# Patient Record
Sex: FEMALE | Race: BLACK OR AFRICAN AMERICAN | NOT HISPANIC OR LATINO | URBAN - METROPOLITAN AREA
[De-identification: names, ages, dates, MRNs, and addresses within clinical notes are randomized per-mention and may not be internally consistent; named-entity substitution may affect disease eponyms.]

---

## 2021-03-12 ENCOUNTER — HISTORICAL (OUTPATIENT)
Dept: ADMINISTRATIVE | Facility: HOSPITAL | Age: 41
End: 2021-03-12

## 2021-03-12 LAB
ABS NEUT (OLG): 4.85 X10(3)/MCL (ref 2.1–9.2)
ALBUMIN SERPL-MCNC: 3.9 GM/DL (ref 3.5–5)
ALBUMIN/GLOB SERPL: 1.3 RATIO (ref 1.1–2)
ALP SERPL-CCNC: 66 UNIT/L (ref 40–150)
ALT SERPL-CCNC: 15 UNIT/L (ref 0–55)
APPEARANCE, UA: CLEAR
AST SERPL-CCNC: 15 UNIT/L (ref 5–34)
BACTERIA #/AREA URNS AUTO: ABNORMAL /HPF
BASOPHILS # BLD AUTO: 0 X10(3)/MCL (ref 0–0.2)
BASOPHILS NFR BLD AUTO: 0 %
BILIRUB SERPL-MCNC: 0.7 MG/DL
BILIRUB UR QL STRIP: NEGATIVE
BILIRUBIN DIRECT+TOT PNL SERPL-MCNC: 0.3 MG/DL (ref 0–0.5)
BILIRUBIN DIRECT+TOT PNL SERPL-MCNC: 0.4 MG/DL (ref 0–0.8)
BUN SERPL-MCNC: 10 MG/DL (ref 7–18.7)
CALCIUM SERPL-MCNC: 8.6 MG/DL (ref 8.4–10.2)
CHLORIDE SERPL-SCNC: 109 MMOL/L (ref 98–107)
CHOLEST SERPL-MCNC: 130 MG/DL
CHOLEST/HDLC SERPL: 3 {RATIO} (ref 0–5)
CO2 SERPL-SCNC: 24 MMOL/L (ref 22–29)
COLOR UR: ABNORMAL
CREAT SERPL-MCNC: 0.91 MG/DL (ref 0.55–1.02)
EOSINOPHIL # BLD AUTO: 0.2 X10(3)/MCL (ref 0–0.9)
EOSINOPHIL NFR BLD AUTO: 2 %
ERYTHROCYTE [DISTWIDTH] IN BLOOD BY AUTOMATED COUNT: 16.5 % (ref 11.5–14.5)
EST CREAT CLEARANCE SER (OHS): 85.58 ML/MIN
EST. AVERAGE GLUCOSE BLD GHB EST-MCNC: 68.1 MG/DL
FOLATE SERPL-MCNC: 9.3 NG/ML (ref 7–31.4)
GLOBULIN SER-MCNC: 3 GM/DL (ref 2.4–3.5)
GLUCOSE (UA): NEGATIVE
GLUCOSE SERPL-MCNC: 79 MG/DL (ref 74–100)
HAV IGM SERPL QL IA: NONREACTIVE
HBA1C MFR BLD: 4 %
HBV CORE IGM SERPL QL IA: NONREACTIVE
HBV SURFACE AG SERPL QL IA: NONREACTIVE
HCT VFR BLD AUTO: 38.2 % (ref 35–46)
HCV AB SERPL QL IA: NONREACTIVE
HDLC SERPL-MCNC: 49 MG/DL (ref 35–60)
HGB BLD-MCNC: 12.8 GM/DL (ref 12–16)
HGB UR QL STRIP: NEGATIVE
HIV 1+2 AB+HIV1 P24 AG SERPL QL IA: NONREACTIVE
HYALINE CASTS #/AREA URNS LPF: ABNORMAL /LPF
IMM GRANULOCYTES # BLD AUTO: 0.03 10*3/UL
IMM GRANULOCYTES NFR BLD AUTO: 0 %
KETONES UR QL STRIP: NEGATIVE
LDLC SERPL CALC-MCNC: 70 MG/DL (ref 50–140)
LEUKOCYTE ESTERASE UR QL STRIP: 75 LEU/UL
LYMPHOCYTES # BLD AUTO: 2 X10(3)/MCL (ref 0.6–4.6)
LYMPHOCYTES NFR BLD AUTO: 26 %
MCH RBC QN AUTO: 25.7 PG (ref 26–34)
MCHC RBC AUTO-ENTMCNC: 33.5 GM/DL (ref 31–37)
MCV RBC AUTO: 76.6 FL (ref 80–100)
MONOCYTES # BLD AUTO: 0.5 X10(3)/MCL (ref 0.1–1.3)
MONOCYTES NFR BLD AUTO: 6 %
NEUTROPHILS # BLD AUTO: 4.85 X10(3)/MCL (ref 2.1–9.2)
NEUTROPHILS NFR BLD AUTO: 65 %
NITRITE UR QL STRIP: NEGATIVE
PH UR STRIP: 7 [PH] (ref 4.5–8)
PLATELET # BLD AUTO: 216 X10(3)/MCL (ref 130–400)
PMV BLD AUTO: 10.6 FL (ref 7.4–10.4)
POTASSIUM SERPL-SCNC: 4.6 MMOL/L (ref 3.5–5.1)
PROT SERPL-MCNC: 6.9 GM/DL (ref 6.4–8.3)
PROT UR QL STRIP: NEGATIVE
RBC # BLD AUTO: 4.99 X10(6)/MCL (ref 4–5.2)
RBC #/AREA URNS AUTO: ABNORMAL /HPF
SODIUM SERPL-SCNC: 140 MMOL/L (ref 136–145)
SP GR UR STRIP: 1.01 (ref 1–1.03)
SQUAMOUS #/AREA URNS LPF: ABNORMAL /LPF
T PALLIDUM AB SER QL: NONREACTIVE
TRIGL SERPL-MCNC: 57 MG/DL (ref 37–140)
TSH SERPL-ACNC: 0.71 UIU/ML (ref 0.35–4.94)
UROBILINOGEN UR STRIP-ACNC: NORMAL
VLDLC SERPL CALC-MCNC: 11 MG/DL
WBC # SPEC AUTO: 7.5 X10(3)/MCL (ref 4.5–11)
WBC #/AREA URNS AUTO: ABNORMAL /HPF

## 2021-03-15 LAB — FINAL CULTURE: NORMAL

## 2021-04-08 ENCOUNTER — HISTORICAL (OUTPATIENT)
Dept: RADIOLOGY | Facility: HOSPITAL | Age: 41
End: 2021-04-08

## 2022-04-09 ENCOUNTER — HISTORICAL (OUTPATIENT)
Dept: ADMINISTRATIVE | Facility: HOSPITAL | Age: 42
End: 2022-04-09

## 2022-04-27 VITALS
WEIGHT: 240.06 LBS | BODY MASS INDEX: 35.56 KG/M2 | DIASTOLIC BLOOD PRESSURE: 69 MMHG | SYSTOLIC BLOOD PRESSURE: 105 MMHG | HEIGHT: 69 IN

## 2022-05-02 NOTE — HISTORICAL [facility] CERNER
This is a historical note converted from [place]anna. Formatting and pictures may have been removed.  Please reference Cerc[facility]ar for original formatting and attached multimedia. Chief Complaint  Need PCP, med refills,c/o pain to right shoulder  History of Present Illness  Pt is a?40 Years?old?AA Female here to establish PCP in [facility], formerly seeing?MD?in [place], [place], for primary care. PMH sickle cell anemia, constipation, depression,?GERD, obesity and tobacco abuse. States has been out of meds for ~ 1 yr since moving from [place]. Pt states she cant remember MDs?name, will call clinic when obtained from old paperwork at home.?Does not follow dash diet or exercise. Does not drink hardly any water daily; admits to knowing hydration is necessary with sickle cell dz. Smokes 1/4 ppd/cigs, onset ~ 6 months ago and marijuana since out of depression medicine (unsure of name). States her 15 y/o daughter has schizophrenia and behavior disorders, cutting,?and is in a group home and continues to run away and gets gang raped. Pt states the marijuana and cigarettes help calm her.?Last saw GYN ~ 5 yrs, has never had MMG. Pt reports constant, throbbing, sharp right shoulder pain, states she cannot lay on that side d/t pain. States had left shoulder surgery in [place], from my sickle cell wearing down the bone and was supposed to have surgery on the right shoulder prior to moving. Also reports has a bump that comes and goes over the past year, that I bust and some thick white stuff comes out, not like pus, and it goes away and comes back. Pt showed photo on cell phone with surrounding redness noted. Declines tetanus vaccine today. Denies chest pain, shortness of breath,?cough, fever, headache,?dizziness, weakness, abdominal pain, nausea,?vomiting, diarrhea, constipation, dysuria, anxiety, SI/HI.  Review of Systems  Constitutional: negative except as stated in HPI  Eye: negative except as stated in HPI  ENT: negative except as stated in  HPI  Respiratory: negative except as stated in HPI  Cardiovascular: negative except as stated in HPI  Gastrointestinal: negative except as stated in HPI  Genitourinary: negative except as stated in HPI  Heme/Lymph: negative except as stated in HPI  Endocrine: negative except as stated in HPI  Immunologic: negative except as stated in HPI  Musculoskeletal: negative except as stated in HPI  Integumentary: negative except as stated in HPI  Neurologic: negative except as stated in HPI  ?   All Other ROS_ negative except as stated in HPI  Physical Exam  Vitals & Measurements  T:?36.6? ?C (Oral)? HR:?75(Peripheral)? RR:?20? BP:?105/69?  HT:?175.00?cm? WT:?108.900?kg? BMI:?35.56? LMP:?03/02/2021 00:00 CST?  General: Alert and oriented, No acute distress.  Head: Normocephalic, Atraumatic.  Eye: Pupils are equal, round and reactive to light, Extraocular movements are intact, Normal conjunctiva, Sclera non-icteric.  Ears/Nose/Mouth/Throat: Normal hearing, Oral mucosa is moist, No pharyngeal erythema.  Neck/Thyroid: Supple, Non-tender, No lymphadenopathy, No thyromegaly, No carotid bruit, No JVD, Full range of motion.  Respiratory: Clear to auscultation bilaterally, Non-labored Respirations, Breath sounds are equal, Symmetrical chest wall expansion, No wheezes, rales or rhonchi.  Cardiovascular: Regular rate and rhythm, Normal S1/S2, No murmurs, rubs or gallops.?Normal peripheral perfusion, No edema.  Gastrointestinal: Soft, obese, Non-tender, Non-distended, Normal bowel sounds, No palpable organomegaly.  Genitourinary: No costovertebral angle tenderness, No inguinal tenderness.  Musculoskeletal: Normal range of motion, Normal strength, No tenderness, Normal gait.  Musculoskeletal:?Right?SHOULDER  Inspection:?Normal posture;?No guarding or self imposed immobilization of arm;?No swelling;?No erythema;?No contusion;?No atrophy or deconditioning noted;?Normal alignment  Palpation:?Tender to AC joint; Crepitus:?Negative;?Normal  temperature  ROM:  ?????Internal Rotation (30): 30  ?????External Rotation (60): 60  ?????Flexion (180): 160  ?????Extension (45): 45  ?????Adduction (45): 45  ?????Abduction (180): 180  Strength:  ???? Flexion: ?5/5  ?????Extension: ?5/5  Special Tests:  ?????Winging of Scapula:?Negative  ?????AC Joint Tenderness:?Negative  ???? Apley Scratch:?Negative  ???? Supraspinatus Isometric Strength:?Negative  ?????Painful Arc:?Negative  ???? Jobes Empty Can:?Negative  ?????Neers:?Negative  ?????Roberto Nunez:?Negative  Neurovascular: Intact, 2+ distal pulses, sensation intact to light touch  Lymphatic: No lymphadenopathy  Integumentary: Warm, Dry, Intact, No suspicious lesions or rashes.  Neurologic: No focal deficits, Cranial Nerves II-XII are grossly intact, Motor strength normal upper and lower extremities, Sensory exam intact.  Psychiatric: Normal interaction, Coherent speech, Euthymic mood, Appropriate affect.  Feet: 2+ pedal pulses bilaterally.  Assessment/Plan  1.?Obesity, Class II, BMI 35-39.9?E66.9  ?BMI 35. Goal BMI <30.  Aerobic exercise 150 minutes per week.  Avoid soda, simple sugars, sweets, excessive rice, pasta, potatoes or bread.?  Choose brown options when available and portion control.  Limit fast foods and fried foods.?  Choose complex carbs in moderation (ex: green, leafy vegetables, beans, oatmeal).  Eat plenty of fresh fruits and vegetables with lean meats daily.?  Consider permanent healthy lifestyle changes.  Ordered:  1160F- Medication reconciliation completed during visit, Obesity, Class II, BMI 35-39.9  Sickle cell disease  Tobacco user  Wellness examination  Screening cholesterol level  Screening for diabetes mellitus  Thyroid disorder screen  Screening for malignant neoplasm of breast  Anxiety and depression,...  1160F- Medication reconciliation completed during visit, Obesity, Class II, BMI 35-39.9  Depression  Sickle cell disease  Constipation  Tobacco user  Wellness  examination  Screening cholesterol level  Screening for diabetes mellitus  Thyroid disorder screen  Screening for malignant neoplasm of abraham...  Clinic Follow up, *Est. 04/12/21 7:40:00 CDT, Order for future visit, Anxiety and depression, [facility]  Office/Outpatient Visit Level 4 New [id number] PC, Obesity, Class II, BMI 35-39.9  Depression  Sickle cell disease  Constipation  Tobacco user  Wellness examination  Screening cholesterol level  Screening for diabetes mellitus  Thyroid disorder screen  Screening for malignant neoplasm of abraham...  ?  2.?Depression?F32.9  ?Rx effexor 37.5 mg.  Denies SI/HI.  F/U in 1 month to evaluate.  Read positive daily meditations, avoid negative?media, set healthy boundaries.  Exercise daily, keep consistent sleep pattern, eat?a healthy diet.  Establish good social support, make changes to reduce stress.  Do not drink alcohol or use illicit drugs.  Reports any symptoms of suicidal/homicidal ideations or self?harm?immediately, go to nearest emergency room.  Ordered:  1160F- Medication reconciliation completed during visit, Obesity, Class II, BMI 35-39.9  Depression  Sickle cell disease  Constipation  Tobacco user  Wellness examination  Screening cholesterol level  Screening for diabetes mellitus  Thyroid disorder screen  Screening for malignant neoplasm of abraham...  Office/Outpatient Visit Level 4 New [id number] PC, Obesity, Class II, BMI 35-39.9  Depression  Sickle cell disease  Constipation  Tobacco user  Wellness examination  Screening cholesterol level  Screening for diabetes mellitus  Thyroid disorder screen  Screening for malignant neoplasm of abraham...  ?  3.?Sickle cell disease?D57.1  ?Rx hydroxyurea 100 mg daily as previously prescribed.  Rx folic acid 1 mg daily.  Encouraged to drink no less than 64 ozs of water daily.  Take  mg prn joint pain.  ED precautions advised for crisis.  Ordered:  1160F- Medication reconciliation completed during visit,  Obesity, Class II, BMI 35-39.9  Sickle cell disease  Tobacco user  Wellness examination  Screening cholesterol level  Screening for diabetes mellitus  Thyroid disorder screen  Screening for malignant neoplasm of breast  Anxiety and depression,...  1160F- Medication reconciliation completed during visit, Obesity, Class II, BMI 35-39.9  Depression  Sickle cell disease  Constipation  Tobacco user  Wellness examination  Screening cholesterol level  Screening for diabetes mellitus  Thyroid disorder screen  Screening for malignant neoplasm of abraham...  Clinic Follow up, *Est. 04/12/21 7:40:00 CDT, Order for future visit, Anxiety and depression, [facility]  Office/Outpatient Visit Level 4 New 06117 PC, Obesity, Class II, BMI 35-39.9  Depression  Sickle cell disease  Constipation  Tobacco user  Wellness examination  Screening cholesterol level  Screening for diabetes mellitus  Thyroid disorder screen  Screening for malignant neoplasm of abraham...  ?  4.?Constipation?K59.00  ?Continue linzess 290 mg as previously prescribed.  Educated on high fiber diet and exercise.  Drink at least 64 ozs of water daily.  Eat hot breakfast q am.  Ordered:  1160F- Medication reconciliation completed during visit, Obesity, Class II, BMI 35-39.9  Depression  Sickle cell disease  Constipation  Tobacco user  Wellness examination  Screening cholesterol level  Screening for diabetes mellitus  Thyroid disorder screen  Screening for malignant neoplasm of abraham...  Office/Outpatient Visit Level 4 New 25105 PC, Obesity, Class II, BMI 35-39.9  Depression  Sickle cell disease  Constipation  Tobacco user  Wellness examination  Screening cholesterol level  Screening for diabetes mellitus  Thyroid disorder screen  Screening for malignant neoplasm of abraham...  ?  5.?Tobacco user?Z72.0  ?Smoking cessation discussed.?  Pt not ready to quit.  Referred to Smoking Cessation program.  Discussed benefits of quitting  including improved health, decreased cardiac/vascular/pulmonary/stroke risks as well as saving money.  Ordered:  1160F- Medication reconciliation completed during visit, Obesity, Class II, BMI 35-39.9  Sickle cell disease  Tobacco user  Wellness examination  Screening cholesterol level  Screening for diabetes mellitus  Thyroid disorder screen  Screening for malignant neoplasm of breast  Anxiety and depression,...  1160F- Medication reconciliation completed during visit, Obesity, Class II, BMI 35-39.9  Depression  Sickle cell disease  Constipation  Tobacco user  Wellness examination  Screening cholesterol level  Screening for diabetes mellitus  Thyroid disorder screen  Screening for malignant neoplasm of abraham...  Clinic Follow up, *Est. 04/12/21 7:40:00 CDT, Order for future visit, Anxiety and depression, [facility]  Office/Outpatient Visit Level 4 New 74605 PC, Obesity, Class II, BMI 35-39.9  Depression  Sickle cell disease  Constipation  Tobacco user  Wellness examination  Screening cholesterol level  Screening for diabetes mellitus  Thyroid disorder screen  Screening for malignant neoplasm of abraham...  ?  6.?Wellness examination?Z00.00  ?Cervical Cancer Screening?-?Last Pap on > 5 yrs.??Referred to?GYN Clinic for annual?Pap/Pelvic.  Breast Cancer?Screening -?Never had MMG. MMG ordered. Follow up annually.  Eye Exam -?  Dental Exam?-  Vaccinations: ?Flu - declines /?Tetanus?- unsure  Labwork -?ordered; pt is fasting.  Ordered:  1160F- Medication reconciliation completed during visit, Obesity, Class II, BMI 35-39.9  Sickle cell disease  Tobacco user  Wellness examination  Screening cholesterol level  Screening for diabetes mellitus  Thyroid disorder screen  Screening for malignant neoplasm of breast  Anxiety and depression,...  1160F- Medication reconciliation completed during visit, Obesity, Class II, BMI 35-39.9  Depression  Sickle cell disease  Constipation  Tobacco user   Wellness examination  Screening cholesterol level  Screening for diabetes mellitus  Thyroid disorder screen  Screening for malignant neoplasm of abraham...  Office/Outpatient Visit Level 4 New 36061 PC, Obesity, Class II, BMI 35-39.9  Depression  Sickle cell disease  Constipation  Tobacco user  Wellness examination  Screening cholesterol level  Screening for diabetes mellitus  Thyroid disorder screen  Screening for malignant neoplasm of abraham...  Urine Culture [id number], Routine collect, 03/12/21 10:40:00 CST, Urine, Collected, Nurse collect, Urine, [id number], Stop date 03/12/21 10:40:00 CST, ~INHERIT, Wellness examination  ?  7.?Screening cholesterol level?Z13.220  ?FLP ordered.  Ordered:  1160F- Medication reconciliation completed during visit, Obesity, Class II, BMI 35-39.9  Sickle cell disease  Tobacco user  Wellness examination  Screening cholesterol level  Screening for diabetes mellitus  Thyroid disorder screen  Screening for malignant neoplasm of breast  Anxiety and depression,...  1160F- Medication reconciliation completed during visit, Obesity, Class II, BMI 35-39.9  Depression  Sickle cell disease  Constipation  Tobacco user  Wellness examination  Screening cholesterol level  Screening for diabetes mellitus  Thyroid disorder screen  Screening for malignant neoplasm of abraham...  Office/Outpatient Visit Level 4 New 23424 PC, Obesity, Class II, BMI 35-39.9  Depression  Sickle cell disease  Constipation  Tobacco user  Wellness examination  Screening cholesterol level  Screening for diabetes mellitus  Thyroid disorder screen  Screening for malignant neoplasm of abraham...  ?  8.?Screening for diabetes mellitus?Z13.1  ?A1C ordered.  Ordered:  1160F- Medication reconciliation completed during visit, Obesity, Class II, BMI 35-39.9  Sickle cell disease  Tobacco user  Wellness examination  Screening cholesterol level  Screening for diabetes mellitus  Thyroid disorder screen   Screening for malignant neoplasm of breast  Anxiety and depression,...  1160F- Medication reconciliation completed during visit, Obesity, Class II, BMI 35-39.9  Depression  Sickle cell disease  Constipation  Tobacco user  Wellness examination  Screening cholesterol level  Screening for diabetes mellitus  Thyroid disorder screen  Screening for malignant neoplasm of abraham...  Office/Outpatient Visit Level 4 New 54812 PC, Obesity, Class II, BMI 35-39.9  Depression  Sickle cell disease  Constipation  Tobacco user  Wellness examination  Screening cholesterol level  Screening for diabetes mellitus  Thyroid disorder screen  Screening for malignant neoplasm of abraham...  ?  9.?Thyroid disorder screen?Z13.29  ?TSH ordered.  Ordered:  1160F- Medication reconciliation completed during visit, Obesity, Class II, BMI 35-39.9  Sickle cell disease  Tobacco user  Wellness examination  Screening cholesterol level  Screening for diabetes mellitus  Thyroid disorder screen  Screening for malignant neoplasm of breast  Anxiety and depression,...  1160F- Medication reconciliation completed during visit, Obesity, Class II, BMI 35-39.9  Depression  Sickle cell disease  Constipation  Tobacco user  Wellness examination  Screening cholesterol level  Screening for diabetes mellitus  Thyroid disorder screen  Screening for malignant neoplasm of abraham...  Office/Outpatient Visit Level 4 New 32461 PC, Obesity, Class II, BMI 35-39.9  Depression  Sickle cell disease  Constipation  Tobacco user  Wellness examination  Screening cholesterol level  Screening for diabetes mellitus  Thyroid disorder screen  Screening for malignant neoplasm of abraham...  ?  10.?Screening for malignant neoplasm of breast?Z12.39  ?MMG ordered.  Ordered:  1160F- Medication reconciliation completed during visit, Obesity, Class II, BMI 35-39.9  Sickle cell disease  Tobacco user  Wellness examination  Screening cholesterol level   Screening for diabetes mellitus  Thyroid disorder screen  Screening for malignant neoplasm of breast  Anxiety and depression,...  1160F- Medication reconciliation completed during visit, Obesity, Class II, BMI 35-39.9  Depression  Sickle cell disease  Constipation  Tobacco user  Wellness examination  Screening cholesterol level  Screening for diabetes mellitus  Thyroid disorder screen  Screening for malignant neoplasm of abraham...  MG Screening Bilateral, Routine, *Est. 04/12/21 3:00:00 CDT, Screening, None, Ambulatory, Rad Type, Order for future visit, Screening for malignant neoplasm of breast, Schedule this test, [facility], Follow Breast Imaging Order Set Protocol, *Est. 04/12/...  Office/Outpatient Visit Level 4 New 66058 PC, Obesity, Class II, BMI 35-39.9  Depression  Sickle cell disease  Constipation  Tobacco user  Wellness examination  Screening cholesterol level  Screening for diabetes mellitus  Thyroid disorder screen  Screening for malignant neoplasm of abraham...  ?  11.?GERD (gastroesophageal reflux disease)?K21.9  ?Continue protonix 20 mg daily.  Avoid spicy, acidic, fried food and alcohol.?  Eat 2-3 hours before bed.  Avoid tight fitting clothes and chew food thoroughly.?  Reduce caffeine intake, avoid soda.?  Take meds as prescribed.?  Encouraged smoking cessation.  Ordered:  1160F- Medication reconciliation completed during visit, Obesity, Class II, BMI 35-39.9  Depression  Sickle cell disease  Constipation  Tobacco user  Wellness examination  Screening cholesterol level  Screening for diabetes mellitus  Thyroid disorder screen  Screening for malignant neoplasm of abraham...  Office/Outpatient Visit Level 4 New 77613 PC, Obesity, Class II, BMI 35-39.9  Depression  Sickle cell disease  Constipation  Tobacco user  Wellness examination  Screening cholesterol level  Screening for diabetes mellitus  Thyroid disorder screen  Screening for malignant  neoplasm of abraham...  ?  12.?Routine screening for STI (sexually transmitted infection)?Z11.3  ?STI screening labs ordered.  Ordered:  1160F- Medication reconciliation completed during visit, Obesity, Class II, BMI 35-39.9  Depression  Sickle cell disease  Constipation  Tobacco user  Wellness examination  Screening cholesterol level  Screening for diabetes mellitus  Thyroid disorder screen  Screening for malignant neoplasm of abraham...  Chlam trachom & N gonorrhoeae by [facility] [id number], Routine collect, Urine, 03/12/21 10:40:00 CST by CLIENT, Once, Stop date 03/12/21 10:40:00 CST, Nurse collect, Urine, Routine screening for STI (sexually transmitted infection), ~INHERIT, 03/12/21 10:06:00 CST  Office/Outpatient Visit Level 4 New 29336 PC, Obesity, Class II, BMI 35-39.9  Depression  Sickle cell disease  Constipation  Tobacco user  Wellness examination  Screening cholesterol level  Screening for diabetes mellitus  Thyroid disorder screen  Screening for malignant neoplasm of abraham...  ?  13.?Right shoulder pain?M25.511  ?XR right shoulder today.  Will call with results and refer to ortho if indicated.  Perform?shoulder exercises?daily.?  Avoid activities than increase?shoulder?pain or stiffness.?  Apply heating pad, ice pack, and BioFreeze/topical capsaicin?as needed; alternate every 15-20 minutes.??  Continue NSAID/muscle relaxer as needed.  Ordered:  1160F- Medication reconciliation completed during visit, Obesity, Class II, BMI 35-39.9  Depression  Sickle cell disease  Constipation  Tobacco user  Wellness examination  Screening cholesterol level  Screening for diabetes mellitus  Thyroid disorder screen  Screening for malignant neoplasm of abraham...  Office/Outpatient Visit Level 4 New 27395 PC, Obesity, Class II, BMI 35-39.9  Depression  Sickle cell disease  Constipation  Tobacco user  Wellness examination  Screening cholesterol level  Screening for diabetes mellitus  Thyroid disorder  screen  Screening for malignant neoplasm of abraham...  ?  14.?Abscess of right breast?N61.1  ?Wound culture collected.  Rx doxycycline 100 mg daily.  Apply warm compress BID.  ED precautions for worsening, fever or signs of infection.  Ordered:  1160F- Medication reconciliation completed during visit, Obesity, Class II, BMI 35-39.9  Depression  Sickle cell disease  Constipation  Tobacco user  Wellness examination  Screening cholesterol level  Screening for diabetes mellitus  Thyroid disorder screen  Screening for malignant neoplasm of abraham...  Office/Outpatient Visit Level 4 New 77344 PC, Obesity, Class II, BMI 35-39.9  Depression  Sickle cell disease  Constipation  Tobacco user  Wellness examination  Screening cholesterol level  Screening for diabetes mellitus  Thyroid disorder screen  Screening for malignant neoplasm of abraham...  Wound Culture, Routine collect, 03/12/21 10:57:00 CST, Boil, Breast R, Nurse collect, Stop date 03/12/21 10:57:00 CST, Abscess of right breast  ?  Orders:  doxycycline, 100 mg = 1 cap(s), Oral, Daily, X 10 day(s), # 10 cap(s), 0 Refill(s), Pharmacy: [facility] #[id number], 175, cm, Height/Length Dosing, 03/12/21 9:34:00 CST, 108.9, kg, Weight Dosing, 03/12/21 9:34:00 CST  folic acid, 1 mg = 1 tab(s), Oral, Daily, # 30 tab(s), 3 Refill(s), Pharmacy: [facility] #[id number], 175, cm, Height/Length Dosing, 03/12/21 9:34:00 CST, 108.9, kg, Weight Dosing, 03/12/21 9:34:00 CST  hydroxyurea, 100 mg = 1 tab(s), Oral, Daily, # 30 tab(s), 3 Refill(s), Pharmacy: [facility] #[id number], 175, cm, Height/Length Dosing, 03/12/21 9:34:00 CST, 108.9, kg, Weight Dosing, 03/12/21 9:34:00 CST  linaclotide, 290 mcg = 1 cap(s), Oral, Daily, # 30 cap(s), 3 Refill(s), Pharmacy: [facility] #[id number], 175, cm, Height/Length Dosing, 03/12/21 9:34:00 CST, 108.9, kg, Weight Dosing, 03/12/21 9:34:00 CST  pantoprazole, 20 mg = 1 tab(s), Oral, Daily, # 30 tab(s), 3 Refill(s),  Pharmacy: [facility] #[id number], 175, cm, Height/Length Dosing, 21 9:34:00 CST, 108.9, kg, Weight Dosing, 21 9:34:00 CST  venlafaxine, 37.5 mg = 1 cap(s), Oral, Daily, # 30 cap(s), 3 Refill(s), Pharmacy: [facility] #[id number], 175, cm, Height/Length Dosing, 21 9:34:00 CST, 108.9, kg, Weight Dosing, 21 9:34:00 CST  Will call with abn lab results.  RTC 1 month and prn.  Referrals  [facility] Internal Referral to Gynecology Clinic, Specialty: Gynecology, Start: 21 9:41:00 CST  Clinic Follow up, *Est. 21 7:40:00 CDT, Order for future visit, Anxiety and depression, [facility]   Problem List/Past Medical History  Ongoing  Constipation  Depression  GERD (gastroesophageal reflux disease)  Obesity, Class II, BMI 35-39.9  Sickle cell disease  Historical  No qualifying data  Procedure/Surgical History   delivery  History of shoulder surgery   Medications  doxycycline hyclate 100 mg oral capsule, 100 mg= 1 cap(s), Oral, Daily  Effexor XR 37.5 mg oral capsule, extended release, 37.5 mg= 1 cap(s), Oral, Daily, 3 refills  folic acid 1 mg oral tablet, 1 mg= 1 tab(s), Oral, Daily, 3 refills  FUROSEMIDE 20 MG TABLET, 20 mg= 1 tab(s), Oral, Daily,? ?Not taking: Last Dose Date/Time Unknown  hydroxyurea 100 mg oral tablet, 100 mg= 1 tab(s), Oral, Daily, 3 refills  linaclotide 290 mcg oral capsule, 290 mcg= 1 cap(s), Oral, Daily, 3 refills  pantoprazole 20 mg ORAL EC-Tablet, 20 mg= 1 tab(s), Oral, Daily, 3 refills  Allergies  Ultram  Social History  Abuse/Neglect  No, No, Yes, 2021  Alcohol - Low Risk, 2015  Current, Wine, 1-2 times per year, 2017  Employment/School  Employed, No, 2021  Exercise  Exercise duration: 30. Exercise frequency: 1-2 times/week. Exercise type: Walking., 2021  Home/Environment  Lives with Children. Living situation: Home/Independent. TV/Computer concerns: No. Apartment, 2021    Never in ,  03/12/2021  Nutrition/Health  Regular, Good, 03/12/2021  Sexual  Gender Identity Identifies as female., 03/12/2021  Spiritual/Cultural  Caodaism, Yes, 03/12/2021  Substance Use - Denies Substance Abuse, 12/27/2015  Current, Marijuana, 1-2 times per week, 03/12/2021  Tobacco - Denies Tobacco Use, 12/27/2015  4 or less cigarettes(less than 1/4 pack)/day in last 30 days, Cigarettes, No, 2 per day. Household tobacco concerns: No., 03/12/2021  Family History  HEART DISEASES: Mother and Brother.  Hypertension.: Mother.  Health Maintenance  Health Maintenance  ???Pending?(in the next year)  ??? ??OverDue  ??? ? ? ?Cervical Cancer Screening due??12/31/17??and every 3??year(s)  ??? ??Refused?  ??? ? ? ?Tetanus Vaccine due??03/12/21??and every 10??year(s)  ??? ??Due In Future?  ??? ? ? ?Obesity Screening not due until??01/01/22??and every 1??year(s)  ??? ? ? ?Smoking Cessation not due until??01/01/22??and every 1??year(s)  ??? ? ? ?Alcohol Misuse Screening not due until??01/02/22??and every 1??year(s)  ???Satisfied?(in the past 1 year)  ??? ??Satisfied?  ??? ? ? ?ADL Screening on??03/12/21.??Satisfied by Day LPN, Yesenia S.  ??? ? ? ?Alcohol Misuse Screening on??03/12/21.??Satisfied by Gladys Taylor NP  ??? ? ? ?Blood Pressure Screening on??03/12/21.??Satisfied by Day LPN, Yesenia S.  ??? ? ? ?Body Mass Index Check on??03/12/21.??Satisfied by Day LPN, Yesenia S.  ??? ? ? ?Depression Screening on??03/12/21.??Satisfied by Day LPN, Yesenia S.  ??? ? ? ?Diabetes Screening on??03/12/21.??Satisfied by Devon Ramirez  ??? ? ? ?Influenza Vaccine on??03/12/21.??Satisfied by Day Yesenia ROSENBAUM  ??? ? ? ?Lipid Screening on??03/12/21.??Satisfied by Devon Ramirez  ??? ? ? ?Obesity Screening on??03/12/21.??Satisfied by Day Yesenia ROSENBAUM  ??? ? ? ?Smoking Cessation on??03/12/21.??Satisfied by Gladys Taylor NP  ??? ??Refused?  ??? ? ? ?Influenza Vaccine on??03/12/21.??Recorded by Yesenia Mills LPN??Reason: Patient Refuses  ???  ? ? ?Tetanus Vaccine on??03/12/21.??Recorded by Yesenia Mills LPN??Reason: Patient Refuses  ?

## 2023-08-15 ENCOUNTER — HOSPITAL ENCOUNTER (EMERGENCY)
Facility: HOSPITAL | Age: 43
Discharge: HOME | End: 2023-08-15
Attending: STUDENT IN AN ORGANIZED HEALTH CARE EDUCATION/TRAINING PROGRAM
Payer: MEDICAID

## 2023-08-15 VITALS
RESPIRATION RATE: 18 BRPM | DIASTOLIC BLOOD PRESSURE: 68 MMHG | TEMPERATURE: 98 F | HEART RATE: 96 BPM | OXYGEN SATURATION: 98 % | SYSTOLIC BLOOD PRESSURE: 100 MMHG

## 2023-08-15 DIAGNOSIS — N39.0 URINARY TRACT INFECTION WITHOUT HEMATURIA, SITE UNSPECIFIED: ICD-10-CM

## 2023-08-15 DIAGNOSIS — S93.409A SPRAIN OF ANKLE, UNSPECIFIED LATERALITY, UNSPECIFIED LIGAMENT, INITIAL ENCOUNTER: Primary | ICD-10-CM

## 2023-08-15 LAB
APPEARANCE UR: CLEAR
B-HCG UR QL: NEGATIVE
BACTERIA #/AREA URNS AUTO: ABNORMAL /HPF
BILIRUB UR QL STRIP.AUTO: NEGATIVE
COLOR UR: ABNORMAL
CTP QC/QA: YES
GLUCOSE UR QL STRIP.AUTO: NORMAL
HYALINE CASTS #/AREA URNS LPF: ABNORMAL /LPF
KETONES UR QL STRIP.AUTO: NEGATIVE
LEUKOCYTE ESTERASE UR QL STRIP.AUTO: 75
NITRITE UR QL STRIP.AUTO: NEGATIVE
PH UR STRIP.AUTO: 8 [PH]
PROT UR QL STRIP.AUTO: NEGATIVE
RBC #/AREA URNS AUTO: ABNORMAL /HPF
RBC UR QL AUTO: NEGATIVE
SP GR UR STRIP.AUTO: 1.01
SQUAMOUS #/AREA URNS LPF: ABNORMAL /HPF
UROBILINOGEN UR STRIP-ACNC: NORMAL
WBC #/AREA URNS AUTO: ABNORMAL /HPF

## 2023-08-15 PROCEDURE — 87186 SC STD MICRODIL/AGAR DIL: CPT | Performed by: PHYSICIAN ASSISTANT

## 2023-08-15 PROCEDURE — 81001 URINALYSIS AUTO W/SCOPE: CPT | Performed by: PHYSICIAN ASSISTANT

## 2023-08-15 PROCEDURE — 87088 URINE BACTERIA CULTURE: CPT | Performed by: PHYSICIAN ASSISTANT

## 2023-08-15 PROCEDURE — 81025 URINE PREGNANCY TEST: CPT | Performed by: PHYSICIAN ASSISTANT

## 2023-08-15 PROCEDURE — 99284 EMERGENCY DEPT VISIT MOD MDM: CPT

## 2023-08-15 RX ORDER — CEPHALEXIN 500 MG/1
500 CAPSULE ORAL 4 TIMES DAILY
Qty: 20 CAPSULE | Refills: 0 | Status: SHIPPED | OUTPATIENT
Start: 2023-08-15 | End: 2023-08-20

## 2023-08-15 RX ORDER — TRAMADOL HYDROCHLORIDE 50 MG/1
50 TABLET ORAL EVERY 12 HOURS PRN
Qty: 10 TABLET | Refills: 0 | Status: SHIPPED | OUTPATIENT
Start: 2023-08-15 | End: 2023-08-20

## 2023-08-16 NOTE — ED PROVIDER NOTES
Encounter Date: 8/15/2023       History     Chief Complaint   Patient presents with    Ankle Pain     Tripped and fell twisting left ankle     Patient reports to the ER with complaints of ankle pain after twisting it prior to arrival; pt also reports foul smelling urine - she denies abdominal pain or bleeding    The history is provided by the patient.   Ankle Pain  This is a new problem. Pertinent negatives include no chest pain and no shortness of breath. The symptoms are aggravated by standing and twisting. The symptoms are relieved by rest.     Review of patient's allergies indicates:  No Known Allergies  No past medical history on file.  No past surgical history on file.  No family history on file.     Review of Systems   Constitutional:  Negative for fever.   HENT:  Negative for sore throat.    Eyes: Negative.    Respiratory:  Negative for shortness of breath.    Cardiovascular:  Negative for chest pain.   Gastrointestinal:  Negative for nausea.   Genitourinary:  Negative for hematuria, pelvic pain and vaginal bleeding.   Musculoskeletal:  Negative for back pain.   Skin:  Negative for rash.   Neurological:  Negative for weakness.   Hematological:  Does not bruise/bleed easily.   Psychiatric/Behavioral: Negative.         Physical Exam     Initial Vitals [08/15/23 1850]   BP Pulse Resp Temp SpO2   100/68 96 18 98.1 °F (36.7 °C) 98 %      MAP       --         Physical Exam    Vitals reviewed.  Constitutional: She appears well-developed and well-nourished.   HENT:   Head: Normocephalic and atraumatic.   Eyes: Conjunctivae and EOM are normal. Pupils are equal, round, and reactive to light.   Neck: Neck supple.   Normal range of motion.  Cardiovascular:  Normal rate, regular rhythm, normal heart sounds and intact distal pulses.           Pulmonary/Chest: Breath sounds normal. No respiratory distress. She has no wheezes. She exhibits no tenderness.   Abdominal: Abdomen is soft. Bowel sounds are normal. There is no  abdominal tenderness.   Musculoskeletal:      Cervical back: Normal range of motion and neck supple.      Right ankle: Normal. Normal pulse.      Left ankle: Swelling present. Tenderness present. Decreased range of motion. Normal pulse.        Legs:      Neurological: She is alert and oriented to person, place, and time. She displays normal reflexes. No cranial nerve deficit or sensory deficit.   Skin: Skin is warm and dry.   Psychiatric: She has a normal mood and affect. Her behavior is normal. Judgment and thought content normal.         ED Course   Procedures  Labs Reviewed   CULTURE, URINE - Abnormal; Notable for the following components:       Result Value    Urine Culture >/= 100,000 colonies/ml Escherichia coli (*)     All other components within normal limits   URINALYSIS, REFLEX TO URINE CULTURE - Abnormal; Notable for the following components:    Leukocyte Esterase, UA 75 (*)     WBC, UA 11-20 (*)     Bacteria, UA Occ (*)     Squamous Epithelial Cells, UA Occ (*)     All other components within normal limits   POCT URINE PREGNANCY          Imaging Results              X-Ray Ankle Complete Left (Final result)  Result time 08/15/23 21:50:21      Final result by Jorden Acevedo MD (08/15/23 21:50:21)                   Impression:      No acute osseous abnormality identified.      Electronically signed by: Jorden Acevedo  Date:    08/15/2023  Time:    21:50               Narrative:    EXAMINATION:  Left ankle three views.    CLINICAL HISTORY:  Fall    TECHNIQUE:  Three views.    COMPARISON:  None available .    FINDINGS:  There is soft tissue inflammation overlying the lateral malleolus.  Articular surfaces are unremarkable and there is no intrinsic osseous abnormality.  There is no acute fracture, dislocation or arthritic change.  Position and alignment is satisfactory.  There is unremarkable mineralization of the bones.  Calcaneal enthesophyte.                        Wet Read by Niall Patel PA (08/15/23  20:50:01, [facility], Emergency Medicine) Flagged as Abnormal    No fracture                                     X-Ray Foot Complete Left (Final result)  Result time 08/15/23 21:51:19      Final result by Jorden Acevedo MD (08/15/23 21:51:19)                   Impression:      Calcaneal enthesophyte.      Electronically signed by: Jorden Acevedo  Date:    08/15/2023  Time:    21:51               Narrative:    EXAMINATION:  XR FOOT COMPLETE 3 VIEW LEFT    CLINICAL HISTORY:  Ankle pain    TECHNIQUE:  Three views    COMPARISON:  None available.    FINDINGS:  Left foot articular surfaces are unremarkable and there is no intrinsic osseous abnormality.  There is no acute fracture, dislocation or arthritic change.  Position and alignment is satisfactory.  There is unremarkable mineralization of the bones.  Calcaneal enthesophyte.                                       Medications - No data to display        APC / Resident Notes:   I was not physically present during the history, exam or disposition of this patient. I was available at all times for consultation. (Panfilo)                     Clinical Impression:   Final diagnoses:  [S93.409A] Sprain of ankle, unspecified laterality, unspecified ligament, initial encounter (Primary)  [N39.0] Urinary tract infection without hematuria, site unspecified        ED Disposition Condition    Discharge Stable          ED Prescriptions       Medication Sig Dispense Start Date End Date Auth. Provider    traMADoL (ULTRAM) 50 mg tablet () Take 1 tablet (50 mg total) by mouth every 12 (twelve) hours as needed for Pain. 10 tablet 8/15/2023 2023 Niall Patel PA    cephALEXin (KEFLEX) 500 MG capsule () Take 1 capsule (500 mg total) by mouth 4 (four) times daily. for 5 days 20 capsule 8/15/2023 2023 Lencho Whittaker MD          Follow-up Information       Follow up With Specialties Details Why Contact Info    Gladys Taylor FNP Nurse  Practitioner   [address] [facility]  Internal Medicine Clinic  [facility] [address]  [phone]      discharge followup    If your symptoms become WORSE or you DO NOT IMPROVE and you are unable to reach your health care provider, you should RETURN to the emergency department    discharge info    Discussed all pertinent ED information, results, diagnosis and treatment plan; All questions and concerns were addressed at this time. Patient voices understanding of information and instructions. Patient is comfortable with plan and discharge             Niall Patel PA  08/30/23 0628       Lencho Whittaker MD  08/30/23 1128

## 2023-08-18 LAB — BACTERIA UR CULT: ABNORMAL

## 2023-08-30 ENCOUNTER — HOSPITAL ENCOUNTER (EMERGENCY)
Facility: HOSPITAL | Age: 43
Discharge: HOME | End: 2023-08-31
Attending: STUDENT IN AN ORGANIZED HEALTH CARE EDUCATION/TRAINING PROGRAM
Payer: MEDICAID

## 2023-08-30 VITALS
HEIGHT: 63 IN | TEMPERATURE: 100 F | WEIGHT: 200 LBS | BODY MASS INDEX: 35.44 KG/M2 | DIASTOLIC BLOOD PRESSURE: 64 MMHG | OXYGEN SATURATION: 100 % | HEART RATE: 89 BPM | SYSTOLIC BLOOD PRESSURE: 95 MMHG | RESPIRATION RATE: 18 BRPM

## 2023-08-30 DIAGNOSIS — U07.1 COVID-19: Primary | ICD-10-CM

## 2023-08-30 DIAGNOSIS — R06.02 SHORTNESS OF BREATH: ICD-10-CM

## 2023-08-30 LAB
ALBUMIN SERPL-MCNC: 3.7 G/DL (ref 3.5–5)
ALBUMIN/GLOB SERPL: 1.3 RATIO (ref 1.1–2)
ALP SERPL-CCNC: 60 UNIT/L (ref 40–150)
ALT SERPL-CCNC: 10 UNIT/L (ref 0–55)
AST SERPL-CCNC: 13 UNIT/L (ref 5–34)
BASOPHILS # BLD AUTO: 0.02 X10(3)/MCL
BASOPHILS NFR BLD AUTO: 0.3 %
BILIRUB SERPL-MCNC: 0.9 MG/DL
BUN SERPL-MCNC: 7.1 MG/DL (ref 7–18.7)
CALCIUM SERPL-MCNC: 9 MG/DL (ref 8.4–10.2)
CHLORIDE SERPL-SCNC: 110 MMOL/L (ref 98–107)
CO2 SERPL-SCNC: 18 MMOL/L (ref 22–29)
CREAT SERPL-MCNC: 0.82 MG/DL (ref 0.55–1.02)
EOSINOPHIL # BLD AUTO: 0.05 X10(3)/MCL (ref 0–0.9)
EOSINOPHIL NFR BLD AUTO: 0.8 %
ERYTHROCYTE [DISTWIDTH] IN BLOOD BY AUTOMATED COUNT: 16.1 % (ref 11.5–17)
FLUAV AG UPPER RESP QL IA.RAPID: NOT DETECTED
FLUBV AG UPPER RESP QL IA.RAPID: NOT DETECTED
GFR SERPLBLD CREATININE-BSD FMLA CKD-EPI: >60 MLS/MIN/1.73/M2
GLOBULIN SER-MCNC: 2.8 GM/DL (ref 2.4–3.5)
GLUCOSE SERPL-MCNC: 93 MG/DL (ref 74–100)
HCT VFR BLD AUTO: 31.8 % (ref 37–47)
HGB BLD-MCNC: 11.1 G/DL (ref 12–16)
IMM GRANULOCYTES # BLD AUTO: 0.02 X10(3)/MCL (ref 0–0.04)
IMM GRANULOCYTES NFR BLD AUTO: 0.3 %
LYMPHOCYTES # BLD AUTO: 0.64 X10(3)/MCL (ref 0.6–4.6)
LYMPHOCYTES NFR BLD AUTO: 9.9 %
MCH RBC QN AUTO: 25.7 PG (ref 27–31)
MCHC RBC AUTO-ENTMCNC: 34.9 G/DL (ref 33–36)
MCV RBC AUTO: 73.6 FL (ref 80–94)
MONOCYTES # BLD AUTO: 0.48 X10(3)/MCL (ref 0.1–1.3)
MONOCYTES NFR BLD AUTO: 7.4 %
NEUTROPHILS # BLD AUTO: 5.24 X10(3)/MCL (ref 2.1–9.2)
NEUTROPHILS NFR BLD AUTO: 81.3 %
NRBC BLD AUTO-RTO: 0.3 %
PLATELET # BLD AUTO: 195 X10(3)/MCL (ref 130–400)
PMV BLD AUTO: 9.9 FL (ref 7.4–10.4)
POTASSIUM SERPL-SCNC: 3.8 MMOL/L (ref 3.5–5.1)
PROT SERPL-MCNC: 6.5 GM/DL (ref 6.4–8.3)
RBC # BLD AUTO: 4.32 X10(6)/MCL (ref 4.2–5.4)
RET# (OHS): 0.16 (ref 0.02–0.08)
RETICULOCYTE COUNT AUTOMATED (OLG): 3.62 % (ref 1.1–2.1)
SARS-COV-2 RNA RESP QL NAA+PROBE: DETECTED
SODIUM SERPL-SCNC: 141 MMOL/L (ref 136–145)
TROPONIN I SERPL-MCNC: <0.01 NG/ML (ref 0–0.04)
WBC # SPEC AUTO: 6.45 X10(3)/MCL (ref 4.5–11.5)

## 2023-08-30 PROCEDURE — 99285 EMERGENCY DEPT VISIT HI MDM: CPT | Mod: 25

## 2023-08-30 PROCEDURE — 85045 AUTOMATED RETICULOCYTE COUNT: CPT | Performed by: STUDENT IN AN ORGANIZED HEALTH CARE EDUCATION/TRAINING PROGRAM

## 2023-08-30 PROCEDURE — 93010 ELECTROCARDIOGRAM REPORT: CPT | Mod: ,,, | Performed by: INTERNAL MEDICINE

## 2023-08-30 PROCEDURE — 80053 COMPREHEN METABOLIC PANEL: CPT | Performed by: STUDENT IN AN ORGANIZED HEALTH CARE EDUCATION/TRAINING PROGRAM

## 2023-08-30 PROCEDURE — 0240U COVID/FLU A&B PCR: CPT | Performed by: STUDENT IN AN ORGANIZED HEALTH CARE EDUCATION/TRAINING PROGRAM

## 2023-08-30 PROCEDURE — 63600175 PHARM REV CODE 636 W HCPCS: Performed by: STUDENT IN AN ORGANIZED HEALTH CARE EDUCATION/TRAINING PROGRAM

## 2023-08-30 PROCEDURE — 85025 COMPLETE CBC W/AUTO DIFF WBC: CPT | Performed by: STUDENT IN AN ORGANIZED HEALTH CARE EDUCATION/TRAINING PROGRAM

## 2023-08-30 PROCEDURE — 96360 HYDRATION IV INFUSION INIT: CPT

## 2023-08-30 PROCEDURE — 93005 ELECTROCARDIOGRAM TRACING: CPT

## 2023-08-30 PROCEDURE — 84484 ASSAY OF TROPONIN QUANT: CPT | Performed by: STUDENT IN AN ORGANIZED HEALTH CARE EDUCATION/TRAINING PROGRAM

## 2023-08-30 PROCEDURE — 93010 EKG 12-LEAD: ICD-10-PCS | Mod: ,,, | Performed by: INTERNAL MEDICINE

## 2023-08-30 RX ORDER — ACETAMINOPHEN 325 MG/1
650 TABLET ORAL
Status: COMPLETED | OUTPATIENT
Start: 2023-08-31 | End: 2023-08-30

## 2023-08-30 RX ADMIN — SODIUM CHLORIDE, POTASSIUM CHLORIDE, SODIUM LACTATE AND CALCIUM CHLORIDE 1000 ML: 600; 310; 30; 20 INJECTION, SOLUTION INTRAVENOUS at 11:08

## 2023-08-30 RX ADMIN — ACETAMINOPHEN 325MG 650 MG: 325 TABLET ORAL at 11:08

## 2023-08-31 PROCEDURE — 25000003 PHARM REV CODE 250: Performed by: STUDENT IN AN ORGANIZED HEALTH CARE EDUCATION/TRAINING PROGRAM

## 2023-08-31 NOTE — ED PROVIDER NOTES
Encounter Date: 8/30/2023       History     Chief Complaint   Patient presents with    Shortness of Breath    Sickle Cell Pain Crisis     HPI    42-year-old female with supposed sickle cell anemia-no electrophoresis on chart, no sickle cell on CBC presents emergency department for cough, congestion and concerns of COVID exposure.  Patient states that her brother was tested positive COVID yesterday.  She states she is scared she has COVID because she has sickle cell.  States she has mild shortness of breath but thinks its anxiety.  No chest pain.    Review of patient's allergies indicates:   Allergen Reactions    Tramadol      No past medical history on file.  No past surgical history on file.  No family history on file.     Review of Systems   Constitutional:  Positive for fatigue. Negative for fever.   HENT:  Positive for congestion.    Respiratory:  Positive for cough and shortness of breath. Negative for wheezing.    Cardiovascular:  Negative for chest pain.   Gastrointestinal:  Negative for abdominal pain.   Musculoskeletal:  Positive for arthralgias and myalgias.   All other systems reviewed and are negative.      Physical Exam     Initial Vitals [08/30/23 2248]   BP Pulse Resp Temp SpO2   95/64 89 18 100.2 °F (37.9 °C) 100 %      MAP       --         Physical Exam    Nursing note and vitals reviewed.  Constitutional: She appears well-developed and well-nourished. No distress.   Cardiovascular:  Normal rate and regular rhythm.           Pulmonary/Chest: Breath sounds normal. No respiratory distress. She has no wheezes. She has no rhonchi. She has no rales. She exhibits no tenderness.   Abdominal: Abdomen is soft. There is no abdominal tenderness.   Musculoskeletal:         General: No tenderness. Normal range of motion.     Neurological: She is alert and oriented to person, place, and time.   Skin: Skin is warm. Capillary refill takes less than 2 seconds.   Psychiatric: She has a normal mood and affect.          ED Course   Procedures  Labs Reviewed   COVID/FLU A&B PCR - Abnormal; Notable for the following components:       Result Value    SARS-CoV-2 PCR Detected (*)     All other components within normal limits    Narrative:     The Xpert Xpress SARS-CoV-2/FLU/RSV plus is a rapid, multiplexed real-time PCR test intended for the simultaneous qualitative detection and differentiation of SARS-CoV-2, Influenza A, Influenza B, and respiratory syncytial virus (RSV) viral RNA in either nasopharyngeal swab or nasal swab specimens.         COMPREHENSIVE METABOLIC PANEL - Abnormal; Notable for the following components:    Chloride 110 (*)     Carbon Dioxide 18 (*)     All other components within normal limits   CBC WITH DIFFERENTIAL - Abnormal; Notable for the following components:    Hgb 11.1 (*)     Hct 31.8 (*)     MCV 73.6 (*)     MCH 25.7 (*)     All other components within normal limits   RETICULOCYTES - Abnormal; Notable for the following components:    Retic Cnt Auto 3.62 (*)     RET# 0.1564 (*)     All other components within normal limits   TROPONIN I - Normal   CBC W/ AUTO DIFFERENTIAL    Narrative:     The following orders were created for panel order CBC auto differential.  Procedure                               Abnormality         Status                     ---------                               -----------         ------                     CBC with Differential[[id number]]        Abnormal            Final result                 Please view results for these tests on the individual orders.     EKG Readings: (Independently Interpreted)   Initial Reading: No STEMI. Rhythm: Normal Sinus Rhythm. Heart Rate: 79. Ectopy: No Ectopy. Conduction: Normal. ST Segments: Normal ST Segments. T Waves: Normal. Clinical Impression: Normal Sinus Rhythm       Imaging Results              X-Ray Chest 1 View (Final result)  Result time 08/30/23 23:30:41      Final result by Jorden Acevedo MD (08/30/23 23:30:41)                    Impression:      NO ACUTE CARDIOPULMONARY PROCESS IDENTIFIED.      Electronically signed by: Jorden Acevedo  Date:    08/30/2023  Time:    23:30               Narrative:    EXAMINATION:  XR CHEST 1 VIEW    CLINICAL HISTORY:  Shortness of breath    TECHNIQUE:  One    COMPARISON:  May 20, 2020.    FINDINGS:  Cardiopericardial silhouette is within normal limits. Lungs are without dense focal or segmental consolidation, congestive process, pleural effusions or pneumothorax.  Left shoulder arthroplasty.                                       Medications   lactated ringers bolus 1,000 mL (1,000 mLs Intravenous New Bag 8/30/23 1147)   acetaminophen tablet 650 mg (has no administration in time range)     Medical Decision Making  differential diagnosis  Viral syndrome, COVID, pneumonia, flu, PE, ACS,  as well as multiple other possible etiologies      Problems Addressed:  COVID-19: acute illness or injury    Amount and/or Complexity of Data Reviewed  Labs: ordered. Decision-making details documented in ED Course.  Radiology: ordered.    Risk  OTC drugs.               ED Course as of 08/30/23 2357   Wed Aug 30, 2023   2352 Troponin I: <0.010 [BS]   2355 SARS-CoV2 (COVID-19) Qualitative PCR(!): Detected [BS]   2355 Very low probability for PE as patient has a normal EKG, non tachycardic and satting 100% on room air [BS]      ED Course User Index  [BS] Josh Ricks MD                    Clinical Impression:   Final diagnoses:  [R06.02] Shortness of breath  [U07.1] COVID-19 (Primary)        ED Disposition Condition    Discharge Stable          ED Prescriptions    None       Follow-up Information       Follow up With Specialties Details Why Contact Info    Gladys Taylor FNP Nurse Practitioner Schedule an appointment as soon as possible for a visit   [address] [facility]  Internal Medicine Clinic  [facility] [address] [phone]      [facility] - Emergency Dept Emergency Medicine Go to  If  symptoms worsen [address]  [facility] [address]  [phone]             Josh Ricks MD  08/30/23 9008